# Patient Record
Sex: FEMALE | URBAN - METROPOLITAN AREA
[De-identification: names, ages, dates, MRNs, and addresses within clinical notes are randomized per-mention and may not be internally consistent; named-entity substitution may affect disease eponyms.]

---

## 2021-10-06 ENCOUNTER — OFFICE VISIT (OUTPATIENT)
Dept: URBAN - METROPOLITAN AREA CLINIC 80 | Facility: CLINIC | Age: 86
End: 2021-10-06
Payer: MEDICARE

## 2021-10-06 ENCOUNTER — DASHBOARD ENCOUNTERS (OUTPATIENT)
Age: 86
End: 2021-10-06

## 2021-10-06 DIAGNOSIS — K21.9 GASTROESOPHAGEAL REFLUX DISEASE, UNSPECIFIED WHETHER ESOPHAGITIS PRESENT: ICD-10-CM

## 2021-10-06 DIAGNOSIS — R63.4 WEIGHT LOSS: ICD-10-CM

## 2021-10-06 DIAGNOSIS — R19.8 GLOBUS SENSATION: ICD-10-CM

## 2021-10-06 DIAGNOSIS — K59.09 CHANGE IN BOWEL MOVEMENTS INTERMITTENT CONSTIPATION. URGENCY IN THE MORNING.: ICD-10-CM

## 2021-10-06 DIAGNOSIS — R49.0 HOARSENESS: ICD-10-CM

## 2021-10-06 PROBLEM — 14760008: Status: ACTIVE | Noted: 2021-10-06

## 2021-10-06 PROBLEM — 235595009: Status: ACTIVE | Noted: 2021-10-06

## 2021-10-06 PROCEDURE — 99204 OFFICE O/P NEW MOD 45 MIN: CPT | Performed by: PHYSICIAN ASSISTANT

## 2021-10-06 PROCEDURE — 99205 OFFICE O/P NEW HI 60 MIN: CPT | Performed by: PHYSICIAN ASSISTANT

## 2021-10-06 RX ORDER — LETROZOLE 2.5 MG/1
1 TABLET TABLET, FILM COATED ORAL ONCE A DAY
Status: ACTIVE | COMMUNITY

## 2021-10-06 RX ORDER — FUROSEMIDE 40 MG/1
1 TABLET TABLET ORAL ONCE A DAY
Status: ACTIVE | COMMUNITY

## 2021-10-06 RX ORDER — CLONIDINE HYDROCHLORIDE 0.1 MG/1
1 TABLET TABLET ORAL ONCE A DAY
Status: ACTIVE | COMMUNITY

## 2021-10-06 RX ORDER — FAMOTIDINE 40 MG/1
1 TABLET AT BEDTIME TABLET, FILM COATED ORAL QHS
Qty: 90 | Refills: 3 | OUTPATIENT
Start: 2021-10-06

## 2021-10-06 RX ORDER — HYDRALAZINE HYDROCHLORIDE 100 MG/1
1 TABLET TABLET, FILM COATED ORAL THREE TIMES A DAY
Status: ACTIVE | COMMUNITY

## 2021-10-06 RX ORDER — LEVOTHYROXINE SODIUM 0.03 MG/1
1 TABLET IN THE MORNING ON AN EMPTY STOMACH TABLET ORAL ONCE A DAY
Status: ACTIVE | COMMUNITY

## 2021-10-06 RX ORDER — BUDESONIDE 0.5 MG/2ML
2 ML SUSPENSION RESPIRATORY (INHALATION) ONCE A DAY
Status: ACTIVE | COMMUNITY

## 2021-10-06 RX ORDER — PANTOPRAZOLE SODIUM 40 MG/1
1 TABLET TABLET, DELAYED RELEASE ORAL BID
Qty: 180 TABLET | Refills: 3 | OUTPATIENT
Start: 2021-10-06

## 2021-10-06 RX ORDER — LOSARTAN POTASSIUM 100 MG/1
1 TABLET TABLET ORAL ONCE A DAY
Status: ACTIVE | COMMUNITY

## 2021-10-06 RX ORDER — CARVEDILOL 25 MG/1
1 TABLET WITH FOOD TABLET, FILM COATED ORAL TWICE A DAY
Status: ACTIVE | COMMUNITY

## 2021-10-06 RX ORDER — POTASSIUM CHLORIDE 750 MG/1
1 TABLET WITH FOOD TABLET, FILM COATED, EXTENDED RELEASE ORAL TWICE A DAY
Status: ACTIVE | COMMUNITY

## 2021-10-06 RX ORDER — OMEPRAZOLE 40 MG/1
1 CAPSULE 30 MINUTES BEFORE MORNING MEAL CAPSULE, DELAYED RELEASE ORAL ONCE A DAY
Status: ACTIVE | COMMUNITY

## 2021-10-06 NOTE — PHYSICAL EXAM CONSTITUTIONAL:
normal,  alert,  in no acute distress,  well developed, well nourished,  ambulating with walker,  normal communication ability

## 2021-10-06 NOTE — HPI-TODAY'S VISIT:
Patient was in the emergency room on September 11 for shortness of breath and leg swelling.  She is a 89-year-old female with history of congestive heart failure, pulmonary hypertension.  Blood work in the emergency room showed a white blood cell count of 14.51, normal liver enzymes, normal hemoglobin and hematocrit.  Her BNP was mildly elevated but not consistent with CHF exacerbation.  EKG and troponins were negative.  No findings were concerning for pneumonia or pneumothorax on chest x-ray and no signs of anemia.  It was felt that her symptoms were most likely secondary to pulmonary hypertension and was referred to follow-up as an outpatient for her chronic esophageal stricture.  She had an esophagram done in August of this year showing a small hiatal hernia, transiently seen transverse folds at the level of the distal esophagus suggestive of gastroesophageal reflux disease. Patient has been having nausea every am when she wakes up and occasionally as the day goes on She cannot eat a full meal, gets full fast She also has trouble swallowing - started in August - food will get stuck and she will at times vomit it back up Uses applesauce to take her pills She spits a lot and has a lot of excess saliva She states she feels like there is something in her midepigastric area that is refluxing back up her chest to her throat She has lost 24 pounds in past 2 months  Drinking ensure 2 times a day She has 1 BM every 3 days - has to strain a lot - very constipated - started to get worse about 3 months ago She has breast cancer but has declined treatment Last colon 2012 - 1.5cm Tubular adenoma Last egd 10/2015 -- schatzki's ring - dilated Failed bedside swallow screen - speech cleared her for full liquid diet